# Patient Record
Sex: MALE | Race: WHITE
[De-identification: names, ages, dates, MRNs, and addresses within clinical notes are randomized per-mention and may not be internally consistent; named-entity substitution may affect disease eponyms.]

---

## 2019-08-12 ENCOUNTER — HOSPITAL ENCOUNTER (EMERGENCY)
Dept: HOSPITAL 91 - FTE | Age: 29
Discharge: HOME | End: 2019-08-12
Payer: COMMERCIAL

## 2019-08-12 ENCOUNTER — HOSPITAL ENCOUNTER (EMERGENCY)
Dept: HOSPITAL 10 - FTE | Age: 29
Discharge: HOME | End: 2019-08-12
Payer: COMMERCIAL

## 2019-08-12 VITALS
WEIGHT: 205.03 LBS | HEIGHT: 65 IN | WEIGHT: 205.03 LBS | BODY MASS INDEX: 34.16 KG/M2 | BODY MASS INDEX: 34.16 KG/M2 | HEIGHT: 65 IN

## 2019-08-12 VITALS — DIASTOLIC BLOOD PRESSURE: 78 MMHG | RESPIRATION RATE: 18 BRPM | SYSTOLIC BLOOD PRESSURE: 122 MMHG | HEART RATE: 101 BPM

## 2019-08-12 DIAGNOSIS — S80.212A: ICD-10-CM
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
## 2019-08-12 DIAGNOSIS — S80.211A: ICD-10-CM

## 2019-08-12 DIAGNOSIS — S62.616A: Primary | ICD-10-CM

## 2019-08-12 DIAGNOSIS — F17.210: ICD-10-CM

## 2019-08-12 PROCEDURE — 29125 APPL SHORT ARM SPLINT STATIC: CPT

## 2019-08-12 PROCEDURE — 73130 X-RAY EXAM OF HAND: CPT

## 2019-08-12 PROCEDURE — 99283 EMERGENCY DEPT VISIT LOW MDM: CPT

## 2019-08-12 RX ADMIN — ACETAMINOPHEN 1 MG: 500 TABLET, FILM COATED ORAL at 04:01
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
